# Patient Record
Sex: FEMALE | Race: WHITE
[De-identification: names, ages, dates, MRNs, and addresses within clinical notes are randomized per-mention and may not be internally consistent; named-entity substitution may affect disease eponyms.]

---

## 2018-04-27 LAB
BASOPHILS # BLD: 0.03 K/UL (ref 0–0.2)
BASOPHILS NFR BLD: 0.7 %
BUN SERPL-MCNC: 24 MG/DL (ref 7–18)
CALCIUM SERPL-MCNC: 8.5 MG/DL (ref 8.5–10.1)
CO2 SERPL-SCNC: 31 MMOL/L (ref 21–32)
CREAT SERPL-MCNC: 0.82 MG/DL (ref 0.6–1.2)
EOS ABS #: 0.28 K/UL (ref 0–0.5)
EOSINOPHIL NFR BLD AUTO: 230 K/UL (ref 130–400)
GLUCOSE SERPL-MCNC: 83 MG/DL (ref 70–99)
HCT VFR BLD CALC: 38.6 % (ref 37–47)
HGB BLD-MCNC: 12.7 G/DL (ref 12–16)
IG#: 0.01 K/UL (ref 0–0.02)
IMM GRANULOCYTES NFR BLD AUTO: 22.3 %
INR PPP: 1 (ref 0.9–1.1)
LYMPHOCYTES # BLD: 0.91 K/UL (ref 1.2–3.4)
MCH RBC QN AUTO: 28.3 PG (ref 25–34)
MCHC RBC AUTO-ENTMCNC: 32.9 G/DL (ref 32–36)
MCV RBC AUTO: 86.2 FL (ref 80–100)
MONO ABS #: 0.35 K/UL (ref 0.11–0.59)
MONOCYTES NFR BLD: 8.6 %
NEUT ABS #: 2.5 K/UL (ref 1.4–6.5)
NEUTROPHILS # BLD AUTO: 6.9 %
NEUTROPHILS NFR BLD AUTO: 61.3 %
PMV BLD AUTO: 10.4 FL (ref 7.4–10.4)
POTASSIUM SERPL-SCNC: 3.8 MMOL/L (ref 3.5–5.1)
PTT PATIENT: 26.1 SECONDS (ref 21–31)
RED CELL DISTRIBUTION WIDTH CV: 14.8 % (ref 11.5–14.5)
RED CELL DISTRIBUTION WIDTH SD: 46.6 FL (ref 36.4–46.3)
SODIUM SERPL-SCNC: 140 MMOL/L (ref 136–145)
WBC # BLD AUTO: 4.08 K/UL (ref 4.8–10.8)

## 2018-04-27 NOTE — DIAGNOSTIC IMAGING REPORT
CHEST 2 VIEWS ROUTINE



HISTORY:  77 years-old Female pat preoperative exam. No acute chest complaints.



COMPARISON: Chest radiographs 9/7/2016



TECHNIQUE: PA and lateral views of the chest



FINDINGS: 

Cardiomediastinal and hilar silhouettes are within normal limits. No

pneumothorax, pleural effusion, focal airspace consolidation or overt pulmonary

edema. Bones of the chest appear grossly intact. There are degenerative changes

of the shoulders and spine.



IMPRESSION: No acute process. 







The above report was generated using voice recognition software. It may contain

grammatical, syntax or spelling errors.







Electronically signed by:  Juwan Crockett M.D.

4/27/2018 10:51 AM



Dictated Date/Time:  4/27/2018 10:50 AM

## 2018-05-07 ENCOUNTER — HOSPITAL ENCOUNTER (OUTPATIENT)
Dept: HOSPITAL 45 - C.ACU | Age: 78
Setting detail: OBSERVATION
LOS: 2 days | Discharge: HOME | End: 2018-05-09
Attending: ORTHOPAEDIC SURGERY | Admitting: ORTHOPAEDIC SURGERY
Payer: COMMERCIAL

## 2018-05-07 VITALS
BODY MASS INDEX: 27.47 KG/M2 | BODY MASS INDEX: 27.47 KG/M2 | WEIGHT: 164.91 LBS | HEIGHT: 65 IN | HEIGHT: 65 IN | WEIGHT: 164.91 LBS

## 2018-05-07 VITALS
DIASTOLIC BLOOD PRESSURE: 65 MMHG | SYSTOLIC BLOOD PRESSURE: 107 MMHG | HEART RATE: 72 BPM | OXYGEN SATURATION: 99 % | TEMPERATURE: 97.52 F

## 2018-05-07 VITALS
SYSTOLIC BLOOD PRESSURE: 134 MMHG | DIASTOLIC BLOOD PRESSURE: 67 MMHG | TEMPERATURE: 97.52 F | HEART RATE: 62 BPM | OXYGEN SATURATION: 100 %

## 2018-05-07 VITALS
DIASTOLIC BLOOD PRESSURE: 60 MMHG | HEART RATE: 59 BPM | TEMPERATURE: 97.52 F | SYSTOLIC BLOOD PRESSURE: 115 MMHG | OXYGEN SATURATION: 98 %

## 2018-05-07 VITALS
HEART RATE: 58 BPM | DIASTOLIC BLOOD PRESSURE: 69 MMHG | OXYGEN SATURATION: 95 % | SYSTOLIC BLOOD PRESSURE: 148 MMHG | TEMPERATURE: 98.24 F

## 2018-05-07 VITALS
HEART RATE: 65 BPM | DIASTOLIC BLOOD PRESSURE: 85 MMHG | TEMPERATURE: 97.7 F | SYSTOLIC BLOOD PRESSURE: 168 MMHG | OXYGEN SATURATION: 92 %

## 2018-05-07 DIAGNOSIS — Z85.850: ICD-10-CM

## 2018-05-07 DIAGNOSIS — K21.9: ICD-10-CM

## 2018-05-07 DIAGNOSIS — M48.07: Primary | ICD-10-CM

## 2018-05-07 DIAGNOSIS — Z88.5: ICD-10-CM

## 2018-05-07 DIAGNOSIS — Z85.820: ICD-10-CM

## 2018-05-07 LAB
HCT VFR BLD CALC: 37.4 % (ref 37–47)
HGB BLD-MCNC: 12.2 G/DL (ref 12–16)

## 2018-05-07 RX ADMIN — ACETAMINOPHEN SCH MLS/HR: 10 INJECTION, SOLUTION INTRAVENOUS at 21:35

## 2018-05-07 RX ADMIN — CEFAZOLIN SCH MLS/MIN: 10 INJECTION, POWDER, FOR SOLUTION INTRAVENOUS at 23:20

## 2018-05-07 RX ADMIN — DEXAMETHASONE SODIUM PHOSPHATE SCH MLS/MIN: 4 INJECTION, SOLUTION INTRA-ARTICULAR; INTRALESIONAL; INTRAMUSCULAR; INTRAVENOUS; SOFT TISSUE at 21:36

## 2018-05-07 RX ADMIN — GABAPENTIN SCH MG: 300 CAPSULE ORAL at 21:35

## 2018-05-07 NOTE — ANESTHESIOLOGY PROGRESS NOTE
Anesthesia Post Op Note


Date & Time


May 7, 2018 at 19:16





Vital Signs


Pain Intensity:  0





Vital Signs Past 12 Hours








  Date Time  Temp Pulse Resp B/P (MAP) Pulse Ox O2 Delivery O2 Flow Rate FiO2


 


5/7/18 19:13 36.9 54 16 138/70 (99) 100 Nasal Cannula 2 


 


5/7/18 19:10    138/70    


 


5/7/18 19:07  58 15     


 


5/7/18 19:07  58 15  99   


 


5/7/18 19:06  56 12     


 


5/7/18 19:06  66 12  100   


 


5/7/18 19:05    141/71    


 


5/7/18 19:01  56 21  100   


 


5/7/18 19:01  58 21     


 


5/7/18 19:00    141/65    


 


5/7/18 18:56  57 11  100   


 


5/7/18 18:56  57 11     


 


5/7/18 18:55    138/72    


 


5/7/18 18:51 36.3 68 16 142/75 (89) 100 Oxymask 10 


 


5/7/18 18:51  91 13 142/75 100   


 


5/7/18 18:51  62 13     


 


5/7/18 10:27 36.5 65 20 168/85 (112) 92 Room Air  











Notes


Mental Status:  alert / awake / arousable, participated in evaluation


Pt Amnestic to Procedure:  Yes


Nausea / Vomiting:  adequately controlled


Pain:  adequately controlled


Airway Patency, RR, SpO2:  stable & adequate


BP & HR:  stable & adequate


Hydration State:  stable & adequate


Anesthetic Complications:  no major complications apparent

## 2018-05-07 NOTE — [facility] POST OPERATIVE BRIEF NOTE
Immediate Operative Summary


Operative Date


May 7, 2018.





Pre-Operative Diagnosis





Severe stenosis L3-L4 and L4-L5





Post-Operative Diagnosis





Severe stenosis L3-S1





Procedure(s) Performed





L3-S1 Laminectomy, L4-S1 Fusion without instrumentation





Surgeon


Dr. Henderson





Assistant Surgeon(s)


Aaron Wiggins PA-C





Estimated Blood Loss


350cc





Findings


Consistent with Post-Op Diagnosis





Specimens





None





Anesthesia Type


General





Complication(s)


none





Disposition


Disposition:  Recovery Room / PACU

## 2018-05-07 NOTE — HISTORY & PHYSICAL EXAMINATION
DATE OF ADMISSION:  05/07/2018

 

Preop for a laminectomy L3-L4, L4-L5, possible spinal implants.

 

HISTORY OF PRESENT ILLNESS:  [place]ia is a delightful, 77, profound

claudication, associated weakness.

 

Gabapentin helps her with pain but not with her weakness.

 

She has no overall worrisome red flags of fevers, sweats, chills.  Does not

have bowel and bladder incontinence.  She has increasing pain, weakness with

extent with walking, alleviated by rest, classic neurogenic claudication.

 

She has profound stenosis and severe critical stenosis, lumbar spine L3-L5.

 

PAST MEDICAL HISTORY:  Anxiety, arthritis, hypothyroid.

 

SOCIAL HISTORY:  Nonsmoker, non-ET[employer] user.  Retired.

 

ALLERGIES:  Denied.

 

REVIEW OF SYSTEMS:  She denies any blurred vision, double vision, tinnitus,

vertigo, loss of sensation or loss of sensorium.

 

Denies any chest pain, palpitations, angina.

 

No asthma, wheezing, shortness of breath.

 

No bowel or bladder incontinence.  No constipation.

 

Her major complaint is musculoskeletal, neurological with back, lower

extremity difficulty, neurogenic claudication without red flags.

 

PHYSICAL EXAMINATION:

GENERAL:  She is alert, oriented at 77 years of age.

VITAL SIGNS:  Blood pressure 130/80, pulse 80, respirations 16.

HEENT:  Essentially normal.  Pupils react to light and accommodation.

CARDIAC:  Normal S1, S2.  Distant S3.  No arrhythmia.

LUNGS:  Clear to auscultation.  No rales, rhonchi, or wheezing.

ABDOMEN:  Soft, nontender.

EXTREMITIES:  Intact x4.  She has slight edema.  Slight varicosities.  She

has diminished reflexes in the Achilles.  Slight weakness with dorsiflexion. 

Knee jerk reflexes 1/4.  Adequate motor strength at quadriceps.  No upper

motor neuron issues.  Facial nerves intact.

 

IMAGES:  Demonstrate severe stenosis, L3-L4 and L4-L5.

 

PLAN:  Includes lumbar surgery, decompression laminectomy L3-L4, L4-L5,

possible implants.

## 2018-05-07 NOTE — DIAGNOSTIC IMAGING REPORT
SPINE ONE VIEW, ANY LEVEL



HISTORY:  Back pain.



FLUOROSCOPY TIME: 30 seconds.



FINDINGS: Intraoperative fluoroscopy was provided for the lumbar spine. 1

fluoroscopic spot images were obtained.    



IMPRESSION: Fluoroscopy provided for a lumbar laminectomy/fusion..











The above report was generated using voice recognition software.  It may contain

grammatical, syntax or spelling errors.







Electronically signed by:  Nabil Sethi M.D.

5/7/2018 8:11 PM



Dictated Date/Time:  5/7/2018 8:11 PM

## 2018-05-08 VITALS
HEART RATE: 66 BPM | OXYGEN SATURATION: 97 % | TEMPERATURE: 98.06 F | DIASTOLIC BLOOD PRESSURE: 66 MMHG | SYSTOLIC BLOOD PRESSURE: 118 MMHG

## 2018-05-08 VITALS
HEART RATE: 89 BPM | TEMPERATURE: 97.52 F | OXYGEN SATURATION: 97 % | SYSTOLIC BLOOD PRESSURE: 113 MMHG | DIASTOLIC BLOOD PRESSURE: 70 MMHG

## 2018-05-08 VITALS
HEART RATE: 61 BPM | OXYGEN SATURATION: 92 % | TEMPERATURE: 97.88 F | DIASTOLIC BLOOD PRESSURE: 63 MMHG | SYSTOLIC BLOOD PRESSURE: 115 MMHG

## 2018-05-08 VITALS
OXYGEN SATURATION: 97 % | TEMPERATURE: 97.52 F | SYSTOLIC BLOOD PRESSURE: 116 MMHG | DIASTOLIC BLOOD PRESSURE: 67 MMHG | HEART RATE: 58 BPM

## 2018-05-08 VITALS
HEART RATE: 63 BPM | OXYGEN SATURATION: 95 % | SYSTOLIC BLOOD PRESSURE: 112 MMHG | TEMPERATURE: 98.06 F | DIASTOLIC BLOOD PRESSURE: 69 MMHG

## 2018-05-08 RX ADMIN — GABAPENTIN SCH MG: 300 CAPSULE ORAL at 08:26

## 2018-05-08 RX ADMIN — SERTRALINE HYDROCHLORIDE SCH MG: 50 TABLET, FILM COATED ORAL at 08:26

## 2018-05-08 RX ADMIN — CEFAZOLIN SCH MLS/MIN: 10 INJECTION, POWDER, FOR SOLUTION INTRAVENOUS at 16:15

## 2018-05-08 RX ADMIN — GABAPENTIN SCH MG: 300 CAPSULE ORAL at 21:10

## 2018-05-08 RX ADMIN — DEXAMETHASONE SODIUM PHOSPHATE SCH MLS/MIN: 4 INJECTION, SOLUTION INTRA-ARTICULAR; INTRALESIONAL; INTRAMUSCULAR; INTRAVENOUS; SOFT TISSUE at 14:01

## 2018-05-08 RX ADMIN — CEFAZOLIN SCH MLS/MIN: 10 INJECTION, POWDER, FOR SOLUTION INTRAVENOUS at 08:25

## 2018-05-08 RX ADMIN — OXYCODONE HYDROCHLORIDE PRN MG: 5 TABLET ORAL at 12:29

## 2018-05-08 RX ADMIN — GABAPENTIN SCH MG: 300 CAPSULE ORAL at 14:02

## 2018-05-08 RX ADMIN — OXYCODONE HYDROCHLORIDE PRN MG: 5 TABLET ORAL at 08:25

## 2018-05-08 RX ADMIN — ACETAMINOPHEN SCH MLS/HR: 10 INJECTION, SOLUTION INTRAVENOUS at 21:12

## 2018-05-08 RX ADMIN — DEXAMETHASONE SODIUM PHOSPHATE SCH MLS/MIN: 4 INJECTION, SOLUTION INTRA-ARTICULAR; INTRALESIONAL; INTRAMUSCULAR; INTRAVENOUS; SOFT TISSUE at 05:36

## 2018-05-08 RX ADMIN — ACETAMINOPHEN SCH MLS/HR: 10 INJECTION, SOLUTION INTRAVENOUS at 05:36

## 2018-05-08 RX ADMIN — ACETAMINOPHEN SCH MLS/HR: 10 INJECTION, SOLUTION INTRAVENOUS at 14:02

## 2018-05-08 RX ADMIN — LEVOTHYROXINE SODIUM SCH MCG: 88 TABLET ORAL at 05:36

## 2018-05-08 RX ADMIN — DEXAMETHASONE SODIUM PHOSPHATE SCH MLS/MIN: 4 INJECTION, SOLUTION INTRA-ARTICULAR; INTRALESIONAL; INTRAMUSCULAR; INTRAVENOUS; SOFT TISSUE at 21:11

## 2018-05-08 RX ADMIN — OXYCODONE HYDROCHLORIDE PRN MG: 5 TABLET ORAL at 13:02

## 2018-05-08 RX ADMIN — Medication SCH GM: at 08:26

## 2018-05-08 RX ADMIN — Medication SCH INTER.UNIT: at 08:26

## 2018-05-08 NOTE — ANESTHESIOLOGY PROGRESS NOTE
Anesthesia Post Op Note


Date & Time


May 8, 2018 at 10:09





Vital Signs


Pain Intensity:  4.0





Vital Signs Past 12 Hours








  Date Time  Temp Pulse Resp B/P (MAP) Pulse Ox O2 Delivery O2 Flow Rate FiO2


 


5/8/18 07:47 36.4 58 17 116/67 (83) 97 Room Air  


 


5/8/18 07:30      Room Air  


 


5/8/18 03:31 36.4 89 15 113/70 (84) 97 Room Air  


 


5/7/18 23:20      Nasal Cannula 2.0 


 


5/7/18 22:39 36.4 72 18 107/65 (79) 99 Nasal Cannula 2.0 











Notes


Mental Status:  alert / awake / arousable, participated in evaluation


Pt Amnestic to Procedure:  Yes


Nausea / Vomiting:  adequately controlled


Pain:  adequately controlled


Airway Patency, RR, SpO2:  stable & adequate


BP & HR:  stable & adequate


Hydration State:  stable & adequate


Anesthetic Complications:  no major complications apparent

## 2018-05-08 NOTE — OPERATIVE REPORT
DATE OF OPERATION:  05/07/2018

 

PREOPERATIVE DIAGNOSIS:  Spinal stenosis, lumbar spine.  Mild instability,

lumbar spine.  Stenosis from L3, L4, L5, and S1.

 

PROCEDURES:

1.  Lumbar spine laminectomy L3, L4, L5, and S1.

2.  Foraminotomies.

3.  Partial facetectomies.

4.  Decompression of each and every nerve root.

5.  Posterior lateral fusion 3, 4, and 5, lumbar spine without

instrumentation.  It was a posterior lateral bone fusion with autograft and

bone mineral substitute.

 

SURGEON:  Federico Henderson DO

 

ASSISTANT:  Aarno Wiggins PA-C

 

COMPLICATIONS:  Zero.

 

BLOOD LOSS:  350.

 

ANESTHETIC:  General.

 

DESCRIPTION OF PROCEDURE:  The patient was taken to the operating room and

general intubated, anesthetic provided to the patient, placed prone, Walton

catheter administered.

 

Scrubbed her, prepped her, and draped sterile.  We made skin incision from 2,

3 down to S1 dissecting soft tissue in the same plane, putting in deep

self-retaining retractor.  The bone was very thick, tremendous amount of

arthritis, facet joint overgrowth, slight degenerative scoliosis.  Using all

techniques, I was able to decompress the neural elements using a jadon,

Kerrisons, curettes.

 

I was pleased with the midline decompression.  We then finished it off with

foraminotomies.  I was also careful, did not appear to the injury to the dura

or injury to the nerve roots.

 

We irrigated thoroughly with about 600 mL of fluid.

 

I then bone grafted out of the facets and transverse processes from 3-5 to

initiate the posterior lateral fusion.

 

We then closed over drain in vancomycin powder with 1 Vicryl, 2-0 and staple

gun.  Sterile dressings applied.  The patient returned to PACU stable.  No

apparent complications.  Sponge and needle count correct.

 

 

I attest to the content of the Intraoperative Record and any orders documented therein. Any exception
s are noted below.

## 2018-05-09 VITALS
SYSTOLIC BLOOD PRESSURE: 133 MMHG | OXYGEN SATURATION: 95 % | TEMPERATURE: 97.7 F | DIASTOLIC BLOOD PRESSURE: 81 MMHG | HEART RATE: 69 BPM

## 2018-05-09 VITALS
DIASTOLIC BLOOD PRESSURE: 81 MMHG | OXYGEN SATURATION: 95 % | HEART RATE: 69 BPM | TEMPERATURE: 97.7 F | SYSTOLIC BLOOD PRESSURE: 133 MMHG

## 2018-05-09 RX ADMIN — Medication SCH GM: at 08:31

## 2018-05-09 RX ADMIN — OXYCODONE HYDROCHLORIDE PRN MG: 5 TABLET ORAL at 07:29

## 2018-05-09 RX ADMIN — GABAPENTIN SCH MG: 300 CAPSULE ORAL at 08:31

## 2018-05-09 RX ADMIN — SERTRALINE HYDROCHLORIDE SCH MG: 50 TABLET, FILM COATED ORAL at 08:31

## 2018-05-09 RX ADMIN — GABAPENTIN SCH MG: 300 CAPSULE ORAL at 13:46

## 2018-05-09 RX ADMIN — LEVOTHYROXINE SODIUM SCH MCG: 88 TABLET ORAL at 05:36

## 2018-05-09 RX ADMIN — ACETAMINOPHEN SCH MLS/HR: 10 INJECTION, SOLUTION INTRAVENOUS at 13:46

## 2018-05-09 RX ADMIN — ACETAMINOPHEN SCH MLS/HR: 10 INJECTION, SOLUTION INTRAVENOUS at 05:37

## 2018-05-09 RX ADMIN — Medication SCH INTER.UNIT: at 08:31

## 2018-05-09 RX ADMIN — OXYCODONE HYDROCHLORIDE PRN MG: 5 TABLET ORAL at 13:46

## 2018-05-09 RX ADMIN — DEXAMETHASONE SODIUM PHOSPHATE SCH MLS/MIN: 4 INJECTION, SOLUTION INTRA-ARTICULAR; INTRALESIONAL; INTRAMUSCULAR; INTRAVENOUS; SOFT TISSUE at 05:37

## 2018-05-09 NOTE — DISCHARGE INSTRUCTIONS
Discharge Instructions


Date of Service


May 9, 2018.





Admission


Reason for Admission:  Spinal Stenosis





Discharge


Discharge Diagnosis / Problem:  same





Discharge Goals


Goal(s):  Improve function





Activity Recommendations


Activity Limitations:  as noted below


Lifting Limitations:  gradually increase as tolerated





.





Instructions / Follow-Up


Instructions / Follow-Up


MEDICATIONS:





Please take your prescriptions as instructed at your pre-op appointment.








SPECIAL CARE:





The following information is intended to answer some of the common questions 

and concerns regarding your surgery.  


Each patient is an individual and receives individual counselling throughout 

the course of treatment, from diagnosis to surgery all the way through 

recovery.  


What follows is not an exhaustive list, but should be a useful guide to some of 

the common questions and concerns patients have regarding their surgeries.


These are not provided to keep you from calling us; rather, they give you 

something accurate and concrete to reference as you recover from your procedure.


 If you need us, we are available to you.





As always, if you are not sure about something, call us at [phone].





MEDICAL EMERGENCIES:





For these conditions, call 911 or go to your local hospital-based Emergency 

Department - not [facility] or equivalent.





*  Paralysis


*  Severe chest pain or difficulty breathing


*  Swelling or redness of either leg





Spine procedures can be rather complex and though complications are rare, they 

do occur.  In such cases, effective advice regarding emergency situations 

cannot always 


be addressed over the telephone.  You may be referred to the emergency 

department for more effective management of your problem.





Activity Limitations:





It is important to give your body time to heal, so please limit your activities

:  





* In general, don't do anything that moves your spine too much.  You should 

avoid contact sports, twisting or heavy lifting while you recover.





* 5-10 pounds is all you should attempt to lift.





* You should not plan on driving for approximately 3 weeks and you should avoid

  traveling more than 30-45 minutes at a time.  


   Longer trips should be broken down with walking breaks spaced appropriately.





* Physical therapy is not usually required.





* Walking and good posture practices will help you recover and regain your 

function.





* Avoid straining or sudden changes in position.





* In general, the goal is to take it easy and recover.  Don't cause any new 

problems.  Just relax.





Showers:





* Do not take a bath, use a Jacuzzi or hot tub or otherwise submerge your 

incision.


 


* It is usually safe to take a shower 4-5 days after your surgery.





* Your incision does not require any special creams or ointments.





* Simply clean it with soap and water, dry and re-dress with a clean bandage 

afterwards.





Incision:





* Keep incision clean, dry and protected until your first follow-up appointment.





* Some amount of drainage and redness is normal.  Any drainage should be fairly 

clear and not have a foul odor.





* If you feel anything is wrong or you have excessive drainage, please call us.





* Your stitches and staples will be removed 10-14 days after your surgery.  At 

the  time of your first post-op visit.





* Neck surgeries are typically closed with a suture underneath the skin.  The 

steri-strips over the incision should be maintained until we see you in the 

office.





Bracing:





* You may be provided with a back or neck brace to encourage good posture and  

prevent injury.  


   It will remind you not to do too much as you heal and will alert others to 

the fact that you have had a surgery.





* Back braces may be removed for showers and when you are resting at home.  

They must be worn when you are walking around for any period of time or for 

travel.





* For neck surgery, you will likely be provided with two cervical collars.  The 

soft collar  (Aspen or foam rubber) is worn most commonly throughout the day 

and while sleeping.


  The plastic collar (provided at the hospital) is for showering/bathing.





* Except while eating, collars should remain in place.  More specifically, 

bracing is provided for a purpose and should be worn.





* Please obtain your brace or collars prior to your operation and bring them to 

the hospital with you on the day of surgery. 





*  You should also bring your collars to your post-op appointment with Dr. Henderson.








You should always take good care of your body and practice healthy habits, 

especially following surgery. 





You should:





* Follow your doctor's treatment plan





* Sit and stand properly with good posture (ears over shoulders, shoulders over 

hips)   Don't slouch





* Learn to lift correctly





* Exercise regularly (low-impact aerobic exercise is especially good, but check 

with your doctor first)





* Generally, be up and walking for 5-10 minutes at a time at least 3-4 times 

per day from the day you get home





* Increasing walking to tolerance until you can walk for 20-30 minutes at a time





* Attain and maintain a healthy body weight





* Eat healthy foods ( a well-balanced, low-fat diet rich in fruits and 

vegetables) and get enough calcium





* Avoid excessive use of alcohol





When to call our office - If you notice any of the following:





* Increased pain not relieve by pain medicine





* Fevers greater then 100 degrees F, chills or flu symptoms





* Increased redness around incision





* Drainage from the incision that is not clear


* Any foul smelling drainage





* Swelling or fluid collection beneath the skin





Miscellaneous:





* In the hospital, you may be given a walker or cane for support while walking.

  These


  are temporary needs and are intended to prevent injuries due to falls.  You 

may 


  discontinue them when you feel strong and steady enough on your feet.





* Sleep in a comfortable position.  We find that many patients find a lounge 

chair or 


  recliner with several pillows to be beneficial in the early post-operative 

period.





* The support stockings should be used for 7-10 days and may be discontinued


  when you are back to walking more and conducting usual household activities.





No problem is insignificant.  We are here to help you and get you well.  


Contact us at [phone].





Definitions:





Foraminotomy: If part of the disc or a bone spur (osteophyte) is pressing on a 

nerve 


as it leaves the vertebra (through an exit called the foramen), a foraminotomy 

may be


done.  Otomy means "to make an opening."  A foraminotomy is making the opening 


of the foramen larger, so the nerve can exit without being compressed.





Laminotomy: Similar to the foraminotomy, a laminotomy makes a larger opening, 


this time in your bony plate protecting your spinal canal and spinal cord (the 

lamina).


The lamina may be pressing on your nerve, so the surgeon may make more room for


the nerves using a laminotomy.





Laminectomy: Sometimes, a laminotomy is not sufficient.  The surgeon may need to


remove all or part of the lamina.  This procedure is called a laminectomy.  

This can 


often be done at many levels without any harmful effects.





Current Hospital Diet


Patient's current hospital diet: Regular Diet





Discharge Diet


Recommended Diet:  Regular Diet





Procedures


Procedures Performed:  


L3-S1 Laminectomy, L4-S1 Fusion without instrumentation





Pending Studies


Studies pending at discharge:  no





Medical Emergencies








.


Who to Call and When:





Medical Emergencies:  If at any time you feel your situation is an emergency, 

please call 911 immediately.





.





Non-Emergent Contact


Non-Emergency issues call your:  Primary Care Provider


.








"Provider Documentation" section prepared by Federico Henderson.








.

## 2018-05-16 NOTE — DISCHARGE SUMMARY
Maria M is status post reconstructive spinal surgery.  She is doing well. 

Discharged home in improved stable condition.  She was alert, oriented, no

chest pain, shortness of breath, no abdominal distention, some lower

extremity difficulties.

 

ASSESSMENT:  Status post reconstructive spine surgery, lumbar spine.

 

PLAN:  We will discharge.  She was discharged home in improved stable

condition.  Instructions given to her from the office.  Instructions in the

hospital.  Walker wheel, medication and followup appointment all taken care

of.